# Patient Record
Sex: FEMALE | Race: OTHER | Employment: FULL TIME | URBAN - METROPOLITAN AREA
[De-identification: names, ages, dates, MRNs, and addresses within clinical notes are randomized per-mention and may not be internally consistent; named-entity substitution may affect disease eponyms.]

---

## 2017-03-06 ENCOUNTER — LAB ENCOUNTER (OUTPATIENT)
Dept: LAB | Age: 37
End: 2017-03-06
Attending: FAMILY MEDICINE
Payer: COMMERCIAL

## 2017-03-06 ENCOUNTER — OFFICE VISIT (OUTPATIENT)
Dept: FAMILY MEDICINE CLINIC | Facility: CLINIC | Age: 37
End: 2017-03-06

## 2017-03-06 VITALS
HEART RATE: 76 BPM | HEIGHT: 60 IN | WEIGHT: 111 LBS | DIASTOLIC BLOOD PRESSURE: 49 MMHG | TEMPERATURE: 98 F | SYSTOLIC BLOOD PRESSURE: 91 MMHG | BODY MASS INDEX: 21.79 KG/M2

## 2017-03-06 DIAGNOSIS — E55.9 VITAMIN D DEFICIENCY: ICD-10-CM

## 2017-03-06 DIAGNOSIS — Z00.00 WELL ADULT EXAM: ICD-10-CM

## 2017-03-06 DIAGNOSIS — Z00.00 WELL ADULT EXAM: Primary | ICD-10-CM

## 2017-03-06 DIAGNOSIS — Z01.419 ENCOUNTER FOR GYNECOLOGICAL EXAMINATION: ICD-10-CM

## 2017-03-06 LAB
ALT SERPL-CCNC: 15 U/L (ref 14–54)
ANION GAP SERPL CALC-SCNC: 8 MMOL/L (ref 0–18)
AST SERPL-CCNC: 18 U/L (ref 15–41)
BASOPHILS # BLD: 0 K/UL (ref 0–0.2)
BASOPHILS NFR BLD: 1 %
BUN SERPL-MCNC: 7 MG/DL (ref 8–20)
BUN/CREAT SERPL: 9.5 (ref 10–20)
CALCIUM SERPL-MCNC: 8.9 MG/DL (ref 8.5–10.5)
CHLORIDE SERPL-SCNC: 105 MMOL/L (ref 95–110)
CHOLEST SERPL-MCNC: 164 MG/DL (ref 110–200)
CO2 SERPL-SCNC: 26 MMOL/L (ref 22–32)
CREAT SERPL-MCNC: 0.74 MG/DL (ref 0.5–1.5)
EOSINOPHIL # BLD: 0.2 K/UL (ref 0–0.7)
EOSINOPHIL NFR BLD: 7 %
ERYTHROCYTE [DISTWIDTH] IN BLOOD BY AUTOMATED COUNT: 14.3 % (ref 11–15)
GLUCOSE SERPL-MCNC: 84 MG/DL (ref 70–99)
HCT VFR BLD AUTO: 36.1 % (ref 35–48)
HDLC SERPL-MCNC: 63 MG/DL
HGB BLD-MCNC: 11.7 G/DL (ref 12–16)
LDLC SERPL CALC-MCNC: 93 MG/DL (ref 0–99)
LYMPHOCYTES # BLD: 1.1 K/UL (ref 1–4)
LYMPHOCYTES NFR BLD: 34 %
MCH RBC QN AUTO: 28 PG (ref 27–32)
MCHC RBC AUTO-ENTMCNC: 32.4 G/DL (ref 32–37)
MCV RBC AUTO: 86.3 FL (ref 80–100)
MONOCYTES # BLD: 0.3 K/UL (ref 0–1)
MONOCYTES NFR BLD: 9 %
NEUTROPHILS # BLD AUTO: 1.6 K/UL (ref 1.8–7.7)
NEUTROPHILS NFR BLD: 49 %
NONHDLC SERPL-MCNC: 101 MG/DL
OSMOLALITY UR CALC.SUM OF ELEC: 285 MOSM/KG (ref 275–295)
PLATELET # BLD AUTO: 170 K/UL (ref 140–400)
PMV BLD AUTO: 10.2 FL (ref 7.4–10.3)
POTASSIUM SERPL-SCNC: 3.6 MMOL/L (ref 3.3–5.1)
RBC # BLD AUTO: 4.18 M/UL (ref 3.7–5.4)
SODIUM SERPL-SCNC: 139 MMOL/L (ref 136–144)
TRIGL SERPL-MCNC: 38 MG/DL (ref 1–149)
TSH SERPL-ACNC: 1.6 UIU/ML (ref 0.34–5.6)
WBC # BLD AUTO: 3.3 K/UL (ref 4–11)

## 2017-03-06 PROCEDURE — 82306 VITAMIN D 25 HYDROXY: CPT

## 2017-03-06 PROCEDURE — 36415 COLL VENOUS BLD VENIPUNCTURE: CPT

## 2017-03-06 PROCEDURE — 80061 LIPID PANEL: CPT

## 2017-03-06 PROCEDURE — 80048 BASIC METABOLIC PNL TOTAL CA: CPT

## 2017-03-06 PROCEDURE — 84450 TRANSFERASE (AST) (SGOT): CPT

## 2017-03-06 PROCEDURE — 99395 PREV VISIT EST AGE 18-39: CPT | Performed by: FAMILY MEDICINE

## 2017-03-06 PROCEDURE — 84460 ALANINE AMINO (ALT) (SGPT): CPT

## 2017-03-06 PROCEDURE — 84443 ASSAY THYROID STIM HORMONE: CPT

## 2017-03-06 PROCEDURE — 85025 COMPLETE CBC W/AUTO DIFF WBC: CPT

## 2017-03-06 NOTE — PROGRESS NOTES
HPI:   Bobby Claire is a 39year old female who presents for a complete physical exam. Symptoms: periods are regular. Works at home some days  Has a 11 yr old son who is autistic. Getting therapy.     Wt Readings from Last 6 Encounters:  03/06/17 : 111 lb or asthma    EXAM:   BP 91/49 mmHg  Pulse 76  Temp(Src) 97.9 °F (36.6 °C) (Oral)  Ht 5' (1.524 m)  Wt 111 lb (50.349 kg)  BMI 21.68 kg/m2  LMP 02/15/2017 (Approximate)  Body mass index is 21.68 kg/(m^2).    GENERAL: well developed, well nourished,in no appa [E]; Future    2. Encounter for gynecological examination    - ThinPrep PAP with HPV Reflex Request; Future    3. Vitamin D deficiency      Elle Sierra MD  3/6/2017  10:27 AM

## 2017-03-06 NOTE — PATIENT INSTRUCTIONS
Keys to Managing Stress  There are several keys to managing stress. First, learn to recognize when you’re under stress and what triggers it. Next, find positive ways of responding to your triggers.  Be sure to take good care of your health and make time t © 1382-2821 The [address] [place] [address], [address]. All rights reserved. This information is not intended as a substitute for professional medical care. Always follow your healthcare professional's instructions.         4 Steps · Cut the sugar in recipes by 1/3 to 1/2. Boost the flavor with extracts like almond, vanilla, or orange. Or add spices such as cinnamon or nutmeg. 4. Eat more fiber  · Eat fresh fruits and vegetables every day. · Boost your diet with whole grains.  Go fo

## 2017-03-08 LAB — 25(OH)D3 SERPL-MCNC: 12.8 NG/ML

## 2017-03-10 LAB — HPV I/H RISK 1 DNA SPEC QL NAA+PROBE: NEGATIVE

## 2017-03-13 ENCOUNTER — TELEPHONE (OUTPATIENT)
Dept: FAMILY MEDICINE CLINIC | Facility: CLINIC | Age: 37
End: 2017-03-13

## 2017-03-13 NOTE — TELEPHONE ENCOUNTER
----- Message from Nimco Garcia MD sent at 3/13/2017  1:54 PM CDT -----  Pap is normal, and HPV is negative.   Recommend repeating pap in 3 years but still need an annual physical and pelvic exam.  pls mail letter

## 2017-03-16 ENCOUNTER — TELEPHONE (OUTPATIENT)
Dept: FAMILY MEDICINE CLINIC | Facility: CLINIC | Age: 37
End: 2017-03-16

## 2017-06-14 RX ORDER — MULTIVIT-MIN/IRON/FOLIC ACID/K 18-600-40
1 CAPSULE ORAL DAILY
Qty: 90 CAPSULE | Refills: 0 | Status: SHIPPED | OUTPATIENT
Start: 2017-06-14 | End: 2017-09-12

## 2017-06-14 RX ORDER — CHOLECALCIFEROL (VITAMIN D3) 1250 MCG
CAPSULE ORAL
Qty: 12 CAPSULE | Refills: 0 | OUTPATIENT
Start: 2017-06-14

## 2017-06-15 NOTE — TELEPHONE ENCOUNTER
Therapy completed per last lab results  Notes Recorded by Nereyda Bernal MD on 3/14/2017 at [address] [facility] other than Vitamin D very low.  Recommend Vitamin D 50,000 U( prescription) weekly for 8 -12 weeks and then 2000 U (OTC) daily  Also white count slig

## 2018-08-27 ENCOUNTER — TELEPHONE (OUTPATIENT)
Dept: OBGYN CLINIC | Facility: CLINIC | Age: 38
End: 2018-08-27

## 2018-08-27 NOTE — TELEPHONE ENCOUNTER
Positive at home pregnancy test, patient would like to establish care with Dr. Jennie Reynaga, LMP 6-28-18 please advice.

## 2018-08-27 NOTE — TELEPHONE ENCOUNTER
Pt calling to report +HPT and LMP of 6/28/18. Pt stated her period in June was her first period since her miscarriage in may. Pt stated she is not on PNV and was advised to purchase OTC PNV with dha, folic acid, and iron.  Pt informed we have male and femal

## 2018-08-28 ENCOUNTER — OFFICE VISIT (OUTPATIENT)
Dept: OBGYN CLINIC | Facility: CLINIC | Age: 38
End: 2018-08-28
Payer: COMMERCIAL

## 2018-08-28 VITALS
BODY MASS INDEX: 19.9 KG/M2 | HEIGHT: 62 IN | HEART RATE: 85 BPM | SYSTOLIC BLOOD PRESSURE: 99 MMHG | WEIGHT: 108.13 LBS | DIASTOLIC BLOOD PRESSURE: 67 MMHG

## 2018-08-28 DIAGNOSIS — N92.6 MISSED MENSES: Primary | ICD-10-CM

## 2018-08-28 PROBLEM — O09.529 ADVANCED MATERNAL AGE IN MULTIGRAVIDA: Status: ACTIVE | Noted: 2018-08-28

## 2018-08-28 PROBLEM — O34.219 PREVIOUS CESAREAN SECTION COMPLICATING PREGNANCY: Status: ACTIVE | Noted: 2018-08-28

## 2018-08-28 LAB
CONTROL LINE PRESENT WITH A CLEAR BACKGROUND (YES/NO): YES YES/NO
KIT LOT #: NORMAL NUMERIC
PREGNANCY TEST, URINE: POSITIVE

## 2018-08-28 PROCEDURE — 81025 URINE PREGNANCY TEST: CPT | Performed by: ADVANCED PRACTICE MIDWIFE

## 2018-08-28 PROCEDURE — 99202 OFFICE O/P NEW SF 15 MIN: CPT | Performed by: ADVANCED PRACTICE MIDWIFE

## 2018-08-28 NOTE — PROGRESS NOTES
Tiki Bae is a 45year old [id number], current EGA of 8w5d presents for amenorrhea. Reports LMP as 06/28/18 with regular 28 day cycles. This is a planned pregnancy and patient is excited. Pt desires natural childbirth with minimal interventions.    Pt r

## 2019-01-16 PROBLEM — O99.019 ANTEPARTUM ANEMIA: Status: ACTIVE | Noted: 2019-01-16

## 2019-01-16 PROCEDURE — 87389 HIV-1 AG W/HIV-1&-2 AB AG IA: CPT | Performed by: OBSTETRICS & GYNECOLOGY

## 2019-03-28 ENCOUNTER — HOSPITAL (OUTPATIENT)
Dept: OTHER | Age: 39
End: 2019-03-28
Attending: OBSTETRICS & GYNECOLOGY

## 2019-03-28 LAB
ANALYZER ANC (IANC): ABNORMAL
BASE DEFICIT BLDCOA-SCNC: 8 MMOL/L
BASE DEFICIT BLDCOV-SCNC: 8 MMOL/L
BASE EXCESS BLDCOA CALC-SCNC: ABNORMAL MMOL/L
BASE EXCESS-RC: ABNORMAL
CONDITION: ABNORMAL
CONDITION: ABNORMAL
ERYTHROCYTE [DISTWIDTH] IN BLOOD: 16.8 % (ref 11–15)
HCO3 BLDCOA-SCNC: 20 MMOL/L (ref 21–28)
HCO3 BLDCOV-SCNC: 20 MMOL/L (ref 22–29)
HEMATOCRIT: 28.3 % (ref 36–46.5)
HGB BLD-MCNC: 8.3 GM/DL (ref 12–15.5)
MCH RBC QN AUTO: 21.3 PG (ref 26–34)
MCHC RBC AUTO-ENTMCNC: 29.3 GM/DL (ref 32–36.5)
MCV RBC AUTO: 72.8 FL (ref 78–100)
NRBC (NRBCRE): 0 /100 WBC
PCO2 BLDCOA: 48 MM HG (ref 31–74)
PCO2 BLDCOV: 48 MM HG (ref 23–49)
PH BLDCOA: 7.22 UNIT (ref 7.18–7.38)
PH BLDCOV: 7.22 UNIT (ref 7.25–7.45)
PLATELET # BLD: 276 THOUSAND/MCL (ref 140–450)
PO2 BLDCOV: <20 MM HG (ref 17–41)
PO2 RC ARTERIAL CORD (RACPO): <20 MM HG (ref 6–31)
RBC # BLD: 3.89 MILLION/MCL (ref 4–5.2)
WBC # BLD: 8.3 THOUSAND/MCL (ref 4.2–11)

## 2019-03-29 LAB
ANALYZER ANC (IANC): ABNORMAL
BASOPHILS # BLD: 0 THOUSAND/MCL (ref 0–0.3)
BASOPHILS NFR BLD: 0 %
DIFFERENTIAL METHOD BLD: ABNORMAL
EOSINOPHIL # BLD: 0.2 THOUSAND/MCL (ref 0.1–0.5)
EOSINOPHIL NFR BLD: 2 %
ERYTHROCYTE [DISTWIDTH] IN BLOOD: 17 % (ref 11–15)
HEMATOCRIT: 24.9 % (ref 36–46.5)
HGB BLD-MCNC: 7.3 GM/DL (ref 12–15.5)
IMM GRANULOCYTES # BLD AUTO: 0 THOUSAND/MCL (ref 0–0.2)
IMM GRANULOCYTES NFR BLD: 1 %
LYMPHOCYTES # BLD: 1.1 THOUSAND/MCL (ref 1–4.8)
LYMPHOCYTES NFR BLD: 14 %
MCH RBC QN AUTO: 21.5 PG (ref 26–34)
MCHC RBC AUTO-ENTMCNC: 29.3 GM/DL (ref 32–36.5)
MCV RBC AUTO: 73.5 FL (ref 78–100)
MONOCYTES # BLD: 0.5 THOUSAND/MCL (ref 0.3–0.9)
MONOCYTES NFR BLD: 6 %
NEUTROPHILS # BLD: 6.4 THOUSAND/MCL (ref 1.8–7.7)
NEUTROPHILS NFR BLD: 77 %
NEUTS SEG NFR BLD: ABNORMAL %
NRBC (NRBCRE): 0 /100 WBC
PLATELET # BLD: 217 THOUSAND/MCL (ref 140–450)
RBC # BLD: 3.39 MILLION/MCL (ref 4–5.2)
WBC # BLD: 8.2 THOUSAND/MCL (ref 4.2–11)

## 2019-05-07 PROBLEM — O34.219 PREVIOUS CESAREAN SECTION COMPLICATING PREGNANCY: Status: RESOLVED | Noted: 2018-08-28 | Resolved: 2019-05-07

## 2019-05-07 PROBLEM — O09.529 ADVANCED MATERNAL AGE IN MULTIGRAVIDA: Status: RESOLVED | Noted: 2018-08-28 | Resolved: 2019-05-07

## 2019-05-07 PROBLEM — O99.019 ANTEPARTUM ANEMIA: Status: RESOLVED | Noted: 2019-01-16 | Resolved: 2019-05-07

## (undated) NOTE — Clinical Note
3/13/2017              Natalio Wilder        [address] [place] [address]         Dear Johanny Martins,      It was a pleasure to see you at our [facility] . Your Pap is normal, and HPV is negative.   There is no need for further testing at this

## (undated) NOTE — MR AVS SNAPSHOT
[address], [address]  [address] [facility]  [phone]               Thank you for choosing us for your health care visit with Austin Hurley.  MD Rigo.  We are glad to serve you and happy to provide you with this summary responding to your triggers. Be sure to take good care of your health and make time to relax. Read on to learn more about the keys to managing stress. Recognizing stress  Learn to recognize your stress and find out what triggers it.  To do this, try to b substitute for professional medical care. Always follow your healthcare professional's instructions. 4 Steps for Eating Healthier  Changing the way you eat can improve your health.  It can lower your cholesterol and blood pressure, and help you stay · Boost your diet with whole grains. Go for oats, whole-grain rice, and bran. · Add beans and lentils to your meals. · Drink more water to match your fiber increase. This is to help prevent constipation.   Date Last Reviewed: 5/11/2015  © 2326-6043 The [facility]